# Patient Record
Sex: MALE | Race: WHITE | NOT HISPANIC OR LATINO | Employment: FULL TIME | URBAN - METROPOLITAN AREA
[De-identification: names, ages, dates, MRNs, and addresses within clinical notes are randomized per-mention and may not be internally consistent; named-entity substitution may affect disease eponyms.]

---

## 2024-02-21 ENCOUNTER — HOSPITAL ENCOUNTER (OUTPATIENT)
Dept: RADIOLOGY | Facility: EXTERNAL LOCATION | Age: 29
Discharge: HOME | End: 2024-02-21
Payer: COMMERCIAL

## 2024-02-21 DIAGNOSIS — M25.521 PAIN IN RIGHT ELBOW: ICD-10-CM

## 2024-02-23 ENCOUNTER — OFFICE VISIT (OUTPATIENT)
Dept: ORTHOPEDIC SURGERY | Facility: HOSPITAL | Age: 29
End: 2024-02-23
Payer: COMMERCIAL

## 2024-02-23 VITALS — HEIGHT: 75 IN | BODY MASS INDEX: 19.89 KG/M2 | WEIGHT: 160 LBS

## 2024-02-23 DIAGNOSIS — M25.521 RIGHT ELBOW PAIN: Primary | ICD-10-CM

## 2024-02-23 DIAGNOSIS — S52.109A CLOSED FRACTURE OF PROXIMAL END OF RADIUS WITHOUT ADDITIONAL FRACTURE: ICD-10-CM

## 2024-02-23 PROCEDURE — 99213 OFFICE O/P EST LOW 20 MIN: CPT

## 2024-02-23 PROCEDURE — 99203 OFFICE O/P NEW LOW 30 MIN: CPT

## 2024-02-23 PROCEDURE — 1036F TOBACCO NON-USER: CPT

## 2024-02-23 RX ORDER — DEXTROAMPHETAMINE SACCHARATE, AMPHETAMINE ASPARTATE, DEXTROAMPHETAMINE SULFATE AND AMPHETAMINE SULFATE 2.5; 2.5; 2.5; 2.5 MG/1; MG/1; MG/1; MG/1
TABLET ORAL
COMMUNITY
Start: 2024-01-26

## 2024-02-23 ASSESSMENT — PAIN - FUNCTIONAL ASSESSMENT: PAIN_FUNCTIONAL_ASSESSMENT: 0-10

## 2024-02-23 ASSESSMENT — PAIN SCALES - GENERAL: PAINLEVEL_OUTOF10: 7

## 2024-02-23 ASSESSMENT — PAIN DESCRIPTION - DESCRIPTORS: DESCRIPTORS: SHARP
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
## 2024-03-04 ENCOUNTER — APPOINTMENT (OUTPATIENT)
Dept: ORTHOPEDIC SURGERY | Facility: HOSPITAL | Age: 29
End: 2024-03-04
Payer: COMMERCIAL

## 2024-03-04 ENCOUNTER — TELEPHONE (OUTPATIENT)
Dept: ORTHOPEDIC SURGERY | Facility: HOSPITAL | Age: 29
End: 2024-03-04
Payer: COMMERCIAL

## 2024-03-04 NOTE — TELEPHONE ENCOUNTER
Per Dr. Riojas's request I called the patient to get him rescheduled with an upper extremity surgeon.  Patient's number on file didn't work, so I called the mom's number and she gave me his correct number. I was able to reach the patient and explain to him that Dr. Riojas wants him to see an upper extremity doctor.  I was able to schedule him with Dr. Israel at [facility] on Wed 3/6 in the morning. I told him that if his symptoms worsened, changed, etc that he should seek emergency treatment and not wait until Wednesday.  He understood this. CT

## 2024-03-06 ENCOUNTER — OFFICE VISIT (OUTPATIENT)
Dept: ORTHOPEDIC SURGERY | Facility: CLINIC | Age: 29
End: 2024-03-06
Payer: COMMERCIAL

## 2024-03-06 DIAGNOSIS — S52.124A NONDISPLACED FRACTURE OF HEAD OF RIGHT RADIUS, INITIAL ENCOUNTER FOR CLOSED FRACTURE: Primary | ICD-10-CM

## 2024-03-06 PROCEDURE — 99213 OFFICE O/P EST LOW 20 MIN: CPT | Performed by: STUDENT IN AN ORGANIZED HEALTH CARE EDUCATION/TRAINING PROGRAM

## 2024-03-06 PROCEDURE — 1036F TOBACCO NON-USER: CPT | Performed by: STUDENT IN AN ORGANIZED HEALTH CARE EDUCATION/TRAINING PROGRAM
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
## 2024-03-06 NOTE — LETTER
March 6, 2024     Patient: Fortunato Vasquez   YOB: 1995   Date of Visit: 3/6/2024       To Whom It May Concern:    It is my medical opinion that Fortunato Vasquez should remain out of work until he is re-evaluated in 4 weeks . He has a fracture of his elbow and cannot weight bear at this time with the right arm.     If you have any questions or concerns, please don't hesitate to call.         Sincerely,        Bereket Israel MD    CC: No Recipients

## 2024-04-02 ENCOUNTER — HOSPITAL ENCOUNTER (OUTPATIENT)
Dept: RADIOLOGY | Facility: CLINIC | Age: 29
Discharge: HOME | End: 2024-04-02
Payer: COMMERCIAL

## 2024-04-02 ENCOUNTER — OFFICE VISIT (OUTPATIENT)
Dept: ORTHOPEDIC SURGERY | Facility: CLINIC | Age: 29
End: 2024-04-02
Payer: COMMERCIAL

## 2024-04-02 VITALS — HEIGHT: 75 IN | BODY MASS INDEX: 19.89 KG/M2 | WEIGHT: 160 LBS

## 2024-04-02 DIAGNOSIS — S52.124A NONDISPLACED FRACTURE OF HEAD OF RIGHT RADIUS, INITIAL ENCOUNTER FOR CLOSED FRACTURE: ICD-10-CM

## 2024-04-02 DIAGNOSIS — G56.21 CUBITAL TUNNEL SYNDROME ON RIGHT: Primary | ICD-10-CM

## 2024-04-02 PROCEDURE — 99214 OFFICE O/P EST MOD 30 MIN: CPT | Performed by: STUDENT IN AN ORGANIZED HEALTH CARE EDUCATION/TRAINING PROGRAM

## 2024-04-02 PROCEDURE — 73070 X-RAY EXAM OF ELBOW: CPT | Mod: RT

## 2024-04-02 PROCEDURE — 73070 X-RAY EXAM OF ELBOW: CPT | Mod: RIGHT SIDE | Performed by: RADIOLOGY

## 2024-04-02 PROCEDURE — 1036F TOBACCO NON-USER: CPT | Performed by: STUDENT IN AN ORGANIZED HEALTH CARE EDUCATION/TRAINING PROGRAM

## 2024-04-02 ASSESSMENT — PAIN DESCRIPTION - DESCRIPTORS: DESCRIPTORS: ACHING;THROBBING

## 2024-04-02 ASSESSMENT — PAIN SCALES - GENERAL: PAINLEVEL_OUTOF10: 5 - MODERATE PAIN

## 2024-04-02 ASSESSMENT — PAIN - FUNCTIONAL ASSESSMENT: PAIN_FUNCTIONAL_ASSESSMENT: 0-10

## 2024-04-02 NOTE — LETTER
April 2, 2024     Patient: Fortunato Vasquez   YOB: 1995   Date of Visit: 4/2/2024       To Whom It May Concern:    It is my medical opinion that Fortunato Vasquez should remain out of work until 6 weeks from now to rehab the right elbow. We will re-evaluate in 6 weeks .    If you have any questions or concerns, please don't hesitate to call.         Sincerely,        Bereket Israel MD    CC: No Recipients

## 2024-04-02 NOTE — PROGRESS NOTES
CHIEF COMPLAINT:   Chief Complaint   Patient presents with    Right Elbow - Follow-up     History: 29 y.o. male presents to the office today for evaluation of his right elbow.  We saw him about 1 month ago.  I noticed him with a nondisplaced radial neck fracture at that time.  He states that the pain in the lateral side of the elbow actually feels better now, but he has now developed pain on the ulnar side of the elbow with associated numbness and tingling in his ulnar-sided digits.  States that he can have sharp, electric type pain at times.  Difficulty with doing any sort of activity with the right arm, especially with weight.  He has been working on range of motion on his own, but has not had any specific therapy yet.    Past medical history, past surgical history, medications, allergies, family history, social history, and review of systems were reviewed today.    A 12 point review of systems was negative other than as stated in the HPI.    Past Medical History:   Diagnosis Date    Chlamydial infection, unspecified 05/28/2019    Chlamydia        No Known Allergies     Past Surgical History:   Procedure Laterality Date    OTHER SURGICAL HISTORY  06/05/2019    No history of surgery        No family history on file.     Social History     Socioeconomic History    Marital status: Single     Spouse name: Not on file    Number of children: Not on file    Years of education: Not on file    Highest education level: Not on file   Occupational History    Not on file   Tobacco Use    Smoking status: Never    Smokeless tobacco: Never   Vaping Use    Vaping Use: Never used   Substance and Sexual Activity    Alcohol use: Never    Drug use: Never    Sexual activity: Not on file   Other Topics Concern    Not on file   Social History Narrative    Not on file     Social Determinants of Health     Financial Resource Strain: Not on file   Food Insecurity: Not on file   Transportation Needs: Not on file   Physical Activity: Not on  "file   Stress: Not on file   Social Connections: Not on file   Intimate Partner Violence: Not on file   Housing Stability: Not on file        CURRENT MEDICATIONS:   Current Outpatient Medications   Medication Sig Dispense Refill    amphetamine-dextroamphetamine (Adderall) 10 mg tablet take 2 tablet by mouth every morning then 1 tablet by mouth IN THE AFTERNOON, OK TO FILL 1/26       No current facility-administered medications for this visit.       Physical Examination:      12/17/2020     3:21 PM 5/12/2021     1:05 PM 2/23/2024     1:21 PM 4/2/2024     8:43 AM   Vitals   Systolic 144 118     Diastolic 78 67     Heart Rate 86 103     Temp 36.7 °C (98 °F) 36.7 °C (98 °F)     Resp 16      Height (in)  1.905 m (6' 3\") 1.905 m (6' 3\") 1.905 m (6' 3\")   Weight (lb)  158 160 160   BMI  19.75 kg/m2 20 kg/m2 20 kg/m2   BSA (m2)  1.95 m2 1.96 m2 1.96 m2   Visit Report   Report Report      Body mass index is 20 kg/m².    Well-appearing, appears stated age, pleasant and cooperative, appropriate mood and behavior. Height and weight reviewed. Alert and oriented x3.  Auditory function intact.  No acute distress.  Intact ocular function, JENISE, EOMI. Breathing is unlabored .  There is no evidence of jugular venous distension. Skin appearance is normal without evidence of rash or other lesions. 2+ radial pulses bilaterally, fingers pink and wwp, good capillary refill, no pitting edema. No appreciable lymphadenopathy in bilateral upper extremities. SILT throughout both upper extremities, median/radial/ulnar/musculocutaneous/axillary nerve motor and sensory intact (except for abnormalities noted in focused musculoskeletal exam section below).     Neck exam: Full range of motion of the neck in flexion/extension and rotational movements. No significant areas of tenderness to palpation in the neck.    On exam of bilateral upper extremities, full range of motion of bilateral elbows with an arc of motion from 0-1 50, full strength with " flexion extension and supination and pronation.  He does have sensitivity over the right ulnar nerve at the cubital tunnel with a positive Tinel's.  There is associated numbness and tingling in the ulnar-sided digits.    Imaging: New radiographs of the right elbow performed today.  First interpreted by myself.  Nondisplaced radial neck fracture in stable alignment.    Assessment: Radial neck fracture, nondisplaced, now with symptoms of cubital tunnel syndrome    Plan: Long discussion with the patient with treatment options diagnosis.  Regarding the fracture, the alignment is stable, a he can use the elbow as tolerated.  However, based on history and examination, I do think he has developed a bit of tunnel syndrome on the right side.  Discussed that this is likely due to the inflammation from the recent injury he had.  I do think he would benefit from a course of occupational therapy to try to alleviate this.  If this does not improve in 6 weeks, we will get an EMG to look for any signs of nerve compression.  All questions were answered today.  I do think it is reasonable for him to stay out of work till we see him back in 6 weeks for reevaluation.    Total encounter time which included preparation for the patient, review of charts, review of imaging, patient encounter, placement of orders, writing of the work note, and dictation of this note was more than 30 minutes.    Dragon software was used to dictate this note, please be aware that minor errors in transcription may be present.    Bereket Israel MD    Shoulder/Elbow Surgery  [facility]

## 2024-04-05 ENCOUNTER — EVALUATION (OUTPATIENT)
Dept: OCCUPATIONAL THERAPY | Facility: HOSPITAL | Age: 29
End: 2024-04-05
Payer: COMMERCIAL

## 2024-04-05 DIAGNOSIS — G56.21 CUBITAL TUNNEL SYNDROME ON RIGHT: Primary | ICD-10-CM

## 2024-04-05 PROCEDURE — 97165 OT EVAL LOW COMPLEX 30 MIN: CPT | Mod: GO | Performed by: OCCUPATIONAL THERAPIST

## 2024-04-05 PROCEDURE — 97110 THERAPEUTIC EXERCISES: CPT | Mod: GO | Performed by: OCCUPATIONAL THERAPIST

## 2024-04-05 ASSESSMENT — ENCOUNTER SYMPTOMS
OCCASIONAL FEELINGS OF UNSTEADINESS: 0
LOSS OF SENSATION IN FEET: 0
DEPRESSION: 0

## 2024-04-05 ASSESSMENT — PAIN SCALES - GENERAL: PAINLEVEL_OUTOF10: 8

## 2024-04-05 ASSESSMENT — PAIN - FUNCTIONAL ASSESSMENT: PAIN_FUNCTIONAL_ASSESSMENT: 0-10
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
## 2024-04-12 ENCOUNTER — TREATMENT (OUTPATIENT)
Dept: OCCUPATIONAL THERAPY | Facility: HOSPITAL | Age: 29
End: 2024-04-12
Payer: COMMERCIAL

## 2024-04-12 DIAGNOSIS — G56.21 CUBITAL TUNNEL SYNDROME ON RIGHT: ICD-10-CM

## 2024-04-12 PROCEDURE — 97035 APP MDLTY 1+ULTRASOUND EA 15: CPT | Mod: GO | Performed by: OCCUPATIONAL THERAPIST

## 2024-04-12 PROCEDURE — 97110 THERAPEUTIC EXERCISES: CPT | Mod: GO | Performed by: OCCUPATIONAL THERAPIST

## 2024-04-12 ASSESSMENT — PAIN SCALES - GENERAL: PAINLEVEL_OUTOF10: 3

## 2024-04-12 ASSESSMENT — PAIN - FUNCTIONAL ASSESSMENT: PAIN_FUNCTIONAL_ASSESSMENT: 0-10

## 2024-04-12 NOTE — PROGRESS NOTES
"    Occupational Therapy  Occupational Therapy Treatment    Patient Name: Fortunato Vasquez  MRN: [id number]  Today's Date: 4/12/2024  Time Calculation  Start Time: 0915  Stop Time: 1000  Time Calculation (min): 45 min  Time:  Time Calculation  Start Time: 0915  Stop Time: 1000  Time Calculation (min): 45 min  OT Modalities Time Entry  Ultrasound Time Entry: 8  OT Therapeutic Procedures Time Entry  Therapeutic Exercise Time Entry: 32    Insurance:  Visit number: 2 of 12  Authorization info:   Insurance Type: Payor: ANTHEM / Plan: ANTHEM HMP / Product Type: *No Product type* /    General:  Reason for visit: R Cubital Tunnel   Referred by: Minerva     Current Problem  1. Cubital tunnel syndrome on right  Follow Up In Occupational Therapy          Precautions   2# lifting restriction       Subjective:   Patient reports \"I feel somewhat better.\"    Performing HEP?: Yes    Pain  Pain Assessment: 0-10  Pain Score: 3  Pain Type: Acute pain  Pain Location: Elbow  Pain Orientation: Right      Objective:     AROM: R elbow extension=0 following tx.     Physical Observation: intact   Edema: none    Sensory: tingling in R RF and SF   Numbness/Tingling: c/o tingling in R RF and SF     Treatments:     Modalities:      13 min  Modalities  Modalities Used: Yes  Modality 1: Untimed Hotpack  Modality 2: Timed Ultrasound; 1.5 w/cm2 with 100% pulsed with 1 mgHz    Therapeutic Exercise:   32 min  Therapeutic Exercise  Therapeutic Exercise Performed: Yes  Therapeutic Exercise Activity 1: tendon gliding x 5 reps each with steps 1-6  Therapeutic Exercise Activity 2: ergometer s 5 reps each  Therapeutic Exercise Activity 3: elevated elbow series x 10 reps each with thumb up, palm up, and palm down  Therapeutic Exercise Activity 4: wrist E/F x 10 reps with FA sup/pron      Assessment: Improved flexibility with decreased c/o pain following tx.        Plan: Continue with plan of care 1-2x/wk       Mary Lechuga OT  "

## 2024-04-19 ENCOUNTER — APPOINTMENT (OUTPATIENT)
Dept: OCCUPATIONAL THERAPY | Facility: HOSPITAL | Age: 29
End: 2024-04-19
Payer: COMMERCIAL

## 2024-04-26 ENCOUNTER — APPOINTMENT (OUTPATIENT)
Dept: OCCUPATIONAL THERAPY | Facility: HOSPITAL | Age: 29
End: 2024-04-26
Payer: COMMERCIAL

## 2024-05-03 ENCOUNTER — APPOINTMENT (OUTPATIENT)
Dept: OCCUPATIONAL THERAPY | Facility: HOSPITAL | Age: 29
End: 2024-05-03
Payer: COMMERCIAL

## 2024-05-09 ENCOUNTER — DOCUMENTATION (OUTPATIENT)
Dept: OCCUPATIONAL THERAPY | Facility: HOSPITAL | Age: 29
End: 2024-05-09
Payer: COMMERCIAL

## 2024-05-09 NOTE — PROGRESS NOTES
Occupational Therapy Discharge      Patient Name: Fortunato Vasquez  MRN: [id number]  Today's Date: 5/9/2024    Insurance:  Visit number: 2 of 2 with evaluation  Authorization info: NO coverage with hospital based system.   Insurance Type: [place]     Referred by: Tawana  Referred for: R Cubital Tunnel      Discharge Information: Date of discharge 5/9/24 and Date of last visit 4/12/24    Therapy Summary: AROM: R elbow extension=0/10.  was seen for Evaluation and 1 TX session. No additional tx was scheduled due to patient having no insurance coverage with a hospital based system.     Discharge Status: Patient requested discharge due to no financial coverage of rehab services.      Rehab Discharge Reason: Patient requested due to financial and/or insurance constraints

## 2024-05-15 ENCOUNTER — OFFICE VISIT (OUTPATIENT)
Dept: ORTHOPEDIC SURGERY | Facility: CLINIC | Age: 29
End: 2024-05-15
Payer: COMMERCIAL

## 2024-05-15 ENCOUNTER — HOSPITAL ENCOUNTER (OUTPATIENT)
Dept: RADIOLOGY | Facility: CLINIC | Age: 29
Discharge: HOME | End: 2024-05-15
Payer: COMMERCIAL

## 2024-05-15 DIAGNOSIS — S52.124A NONDISPLACED FRACTURE OF HEAD OF RIGHT RADIUS, INITIAL ENCOUNTER FOR CLOSED FRACTURE: ICD-10-CM

## 2024-05-15 PROCEDURE — 99213 OFFICE O/P EST LOW 20 MIN: CPT | Performed by: STUDENT IN AN ORGANIZED HEALTH CARE EDUCATION/TRAINING PROGRAM

## 2024-05-15 PROCEDURE — 73080 X-RAY EXAM OF ELBOW: CPT | Mod: RIGHT SIDE | Performed by: STUDENT IN AN ORGANIZED HEALTH CARE EDUCATION/TRAINING PROGRAM

## 2024-05-15 PROCEDURE — 73080 X-RAY EXAM OF ELBOW: CPT | Mod: RT

## 2024-05-15 NOTE — PROGRESS NOTES
CHIEF COMPLAINT: No chief complaint on file.    History: 29 y.o. male presents to the office today for follow-up of his right elbow.  He has a nondisplaced radial neck fracture that we are treating operatively.  We last saw him, he had symptoms of cubital tunnel syndrome.  He states that the numbness and tingling has improved from that aspect, though he still gets it at times.  No pain in the lateral elbow.  Is working with therapy and does state that it does help quite a bit.  Returning to work tomorrow.    Past medical history, past surgical history, medications, allergies, family history, social history, and review of systems were reviewed today.    A 12 point review of systems was negative other than as stated in the HPI.    Past Medical History:   Diagnosis Date    Chlamydial infection, unspecified 05/28/2019    Chlamydia        No Known Allergies     Past Surgical History:   Procedure Laterality Date    OTHER SURGICAL HISTORY  06/05/2019    No history of surgery        No family history on file.     Social History     Socioeconomic History    Marital status: Single     Spouse name: Not on file    Number of children: Not on file    Years of education: Not on file    Highest education level: Not on file   Occupational History    Not on file   Tobacco Use    Smoking status: Never    Smokeless tobacco: Never   Vaping Use    Vaping status: Never Used   Substance and Sexual Activity    Alcohol use: Never    Drug use: Never    Sexual activity: Not on file   Other Topics Concern    Not on file   Social History Narrative    Not on file     Social Determinants of Health     Financial Resource Strain: Not on file   Food Insecurity: Not on file   Transportation Needs: Not on file   Physical Activity: Not on file   Stress: Not on file   Social Connections: Not on file   Intimate Partner Violence: Not on file   Housing Stability: Not on file        CURRENT MEDICATIONS:   Current Outpatient Medications   Medication Sig  "Dispense Refill    amphetamine-dextroamphetamine (Adderall) 10 mg tablet take 2 tablet by mouth every morning then 1 tablet by mouth IN THE AFTERNOON, OK TO FILL 1/26       No current facility-administered medications for this visit.       Physical Examination:      12/17/2020     3:21 PM 5/12/2021     1:05 PM 2/23/2024     1:21 PM 4/2/2024     8:43 AM   Vitals   Systolic 144 118     Diastolic 78 67     Heart Rate 86 103     Temp 36.7 °C (98 °F) 36.7 °C (98 °F)     Resp 16      Height (in)  1.905 m (6' 3\") 1.905 m (6' 3\") 1.905 m (6' 3\")   Weight (lb)  158 160 160   BMI  19.75 kg/m2 20 kg/m2 20 kg/m2   BSA (m2)  1.95 m2 1.96 m2 1.96 m2   Visit Report   Report Report      There is no height or weight on file to calculate BMI.    Well-appearing, appears stated age, pleasant and cooperative, appropriate mood and behavior. Height and weight reviewed. Alert and oriented x3.  Auditory function intact.  No acute distress.  Intact ocular function, JENISE, EOMI. Breathing is unlabored .  There is no evidence of jugular venous distension. Skin appearance is normal without evidence of rash or other lesions. 2+ radial pulses bilaterally, fingers pink and wwp, good capillary refill, no pitting edema. No appreciable lymphadenopathy in bilateral upper extremities. SILT throughout both upper extremities, median/radial/ulnar/musculocutaneous/axillary nerve motor and sensory intact (except for abnormalities noted in focused musculoskeletal exam section below).     Neck exam: Full range of motion of the neck in flexion/extension and rotational movements. No significant areas of tenderness to palpation in the neck.    On exam of bilateral upper extremities, symmetric range of motion of both elbows with arc of motion from 0-1 50.  Does lack just a bit of extension compared to contralateral side.  Full pronation and supination.  Mild sensitivity over the ulnar nerve much improved from the last visit.  No tenderness palpation of the " lateral elbow.    Imaging: New radiographs of the right elbow performed today.  Personally interpreted by myself.  Healed radial neck fracture.    Assessment: Healed radial neck fracture, improving cubital tunnel syndrome    Plan: Discussed with the patient at this point his fracture is healed he can slowly return back to all of his desired activities without restrictions.  I am okay with him returning to work tomorrow.  In terms of the cubital tunnel syndrome, this does seem to be improving, so I do not think that we need to get an EMG at this time.  If this worsens, this would be the next step.  At this point we will have him follow-up as needed.  Questions were answered.    Dragon software was used to dictate this note, please be aware that minor errors in transcription may be present.    Bereket Israel MD    Shoulder/Elbow Surgery  [facility]

## 2025-07-21 ENCOUNTER — APPOINTMENT (OUTPATIENT)
Dept: UROLOGY | Facility: CLINIC | Age: 30
End: 2025-07-21
Payer: COMMERCIAL